# Patient Record
Sex: FEMALE | Race: WHITE | NOT HISPANIC OR LATINO | Employment: FULL TIME | URBAN - METROPOLITAN AREA
[De-identification: names, ages, dates, MRNs, and addresses within clinical notes are randomized per-mention and may not be internally consistent; named-entity substitution may affect disease eponyms.]

---

## 2020-03-15 ENCOUNTER — VIRTUAL VISIT (OUTPATIENT)
Dept: FAMILY MEDICINE | Facility: OTHER | Age: 30
End: 2020-03-15

## 2020-03-15 NOTE — PROGRESS NOTES
"Date: 03/15/2020 12:08:13  Clinician: Giulia Lynch  Clinician NPI: [id number]  Patient: Nicole Coreas  Patient : 1990  Patient Address: [address] [place] [address] [place] [address]  Patient Phone: [phone]  Visit Protocol: URI  Patient Summary:  Nicole is a 29 year old ( : 1990 ) female who initiated a Visit for COVID-19 (Coronavirus) evaluation and screening. When asked the question \"Please sign me up to receive news, health information and promotions from [employer].\", Nicole responded \"No\".    Nicole states her symptoms started 1-2 days ago.   Her symptoms consist of malaise, a headache, myalgia, a cough, and nasal congestion. She is experiencing mild difficulty breathing with activities but can speak normally in full sentences.   Symptom details     Nasal secretions: The color of her mucus is yellow.    Cough: Nicole coughs a few times an hour and her cough is not more bothersome at night. Phlegm comes into her throat when she coughs. She does not believe her cough is caused by post-nasal drip. The color of the phlegm is yellow.     Headache: She states the headache is mild (1-3 on a 10 point pain scale).      Nicole denies having ear pain, fever, rhinitis, facial pain or pressure, chills, wheezing, sore throat, and teeth pain. She also denies having a sinus infection within the past year, having recent facial or sinus surgery in the past 60 days, and taking antibiotic medication for the symptoms.   Precipitating events  She has not recently been exposed to someone with influenza. Nicole has been in close contact with the following high risk individuals: people with asthma, heart disease or diabetes, adults 65 or older, pregnant women, and children under the age of 5.   Pertinent COVID-19 (Coronavirus) information  Nicole has not traveled internationally or to the areas where COVID-19 (Coronavirus) is widespread in the last 14 days before the start of her symptoms.   " Nicole has not had close contact with a suspected or laboratory-confirmed COVID-19 patient within 14 days of symptom onset.   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   Pertinent medical history  Nicole does not get yeast infections when she takes antibiotics.   Nicole needs a return to work/school note.   Weight: 140 lbs   Nicole does not smoke or use smokeless tobacco.   She denies pregnancy and denies breastfeeding. Her last period was over a month ago.   Additional information as reported by the patient (free text): Recovering from shingles (dx on Monday 3/9). Also have diabetes.   Weight: 140 lbs    MEDICATIONS: valacyclovir oral, Daily Multivitamin-Minerals oral, Vitamin D3 oral, calcium citrate-vitamin D3 oral, pravastatin oral, duloxetine oral, Nexplanon subdermal, metformin oral, lisinopril oral, ALLERGIES: rosuvastatin  Clinician Response:  Dear Nicole,   Dear Nicole,  Based on the information you have provided, it does not appear you need Coronavirus (COVID-19) testing.   At this time, we recommend testing primarily for those people who have symptoms of cough and/ or&nbsp;fever and have either traveled to a known area of infection or have been exposed to someone with laboratory confirmed Coronavirus by close contact.   Health care workers that are in direct contact with patients need to have a cough and fever or exposure and a cough or fever.&nbsp;   Coronavirus - General Information:   The coronavirus infection starts within 14 days of an exposure.  Symptoms are those of a respiratory infection (such as fever, cough).   If you have not had symptoms by day 15, you should be considered uninfected by coronavirus.   Coronavirus - Symptoms:    The coronavirus can cause a respiratory illness, such as bronchitis or pneumonia.  The most common symptoms are: cough, fever, and shortness of breath.   Other symptoms are: body aches, chills, diarrhea, fatigue, headache,  runny nose, and sore throat   Coronavirus - Exposure Risk Factors:   Exposure to a person who has been diagnosed with coronavirus.  Travel from an area with recent local transmission of coronavirus.  The CDC (www.cdc.gov) has the most up-to-date list of where the coronavirus outbreak is occurring.   Coronavirus - Spreading:    The virus likely spreads through respiratory droplets produced when a person coughs or sneezes. These respiratory droplets can travel approximately 6 feet and can remain on surfaces. Common disinfectants will kill the virus.  The CDC currently does not recommend healthy people wear masks.   Coronavirus - Protect Yourself:    Avoid close contact with people known to have this new coronavirus infection.  Wash hands often with soap and water or alcohol-based hand .  Avoid touching the eyes, nose or mouth.   Thank you for limiting contact with others, wearing a simple mask to cover your cough, practice good hand hygiene habits and accessing our virtual services where possible to limit the spread of this virus.  For more information about COVID19 and options for caring for yourself at home, please visit the CDC website at https://www.cdc.gov/coronavirus/2019-ncov/about/steps-when-sick.html   For more options for care at [facility], please visit our website at [email or web address]     Diagnosis: Cough  Diagnosis ICD: R05

## 2021-04-17 ENCOUNTER — HEALTH MAINTENANCE LETTER (OUTPATIENT)
Age: 31
End: 2021-04-17

## 2021-10-02 ENCOUNTER — HEALTH MAINTENANCE LETTER (OUTPATIENT)
Age: 31
End: 2021-10-02

## 2022-05-14 ENCOUNTER — HEALTH MAINTENANCE LETTER (OUTPATIENT)
Age: 32
End: 2022-05-14

## 2023-01-14 ENCOUNTER — HEALTH MAINTENANCE LETTER (OUTPATIENT)
Age: 33
End: 2023-01-14

## 2023-06-02 ENCOUNTER — HEALTH MAINTENANCE LETTER (OUTPATIENT)
Age: 33
End: 2023-06-02